# Patient Record
Sex: MALE | Race: ASIAN | NOT HISPANIC OR LATINO | URBAN - METROPOLITAN AREA
[De-identification: names, ages, dates, MRNs, and addresses within clinical notes are randomized per-mention and may not be internally consistent; named-entity substitution may affect disease eponyms.]

---

## 2018-05-26 ENCOUNTER — EMERGENCY (EMERGENCY)
Facility: HOSPITAL | Age: 28
LOS: 1 days | Discharge: HOME | End: 2018-05-26
Attending: EMERGENCY MEDICINE
Payer: COMMERCIAL

## 2018-05-26 VITALS
TEMPERATURE: 98 F | HEART RATE: 77 BPM | RESPIRATION RATE: 15 BRPM | DIASTOLIC BLOOD PRESSURE: 71 MMHG | OXYGEN SATURATION: 99 % | SYSTOLIC BLOOD PRESSURE: 106 MMHG

## 2018-05-26 DIAGNOSIS — R09.89 OTHER SPECIFIED SYMPTOMS AND SIGNS INVOLVING THE CIRCULATORY AND RESPIRATORY SYSTEMS: ICD-10-CM

## 2018-05-26 PROCEDURE — 99284 EMERGENCY DEPT VISIT MOD MDM: CPT

## 2018-05-26 PROCEDURE — 71045 X-RAY EXAM CHEST 1 VIEW: CPT

## 2018-05-26 PROCEDURE — 99283 EMERGENCY DEPT VISIT LOW MDM: CPT

## 2018-05-26 PROCEDURE — 70360 X-RAY EXAM OF NECK: CPT | Mod: 26

## 2018-05-26 PROCEDURE — 71045 X-RAY EXAM CHEST 1 VIEW: CPT | Mod: 26

## 2018-05-26 PROCEDURE — 70360 X-RAY EXAM OF NECK: CPT

## 2018-05-26 RX ORDER — ACETAMINOPHEN 500 MG
975 TABLET ORAL ONCE
Qty: 0 | Refills: 0 | Status: COMPLETED | OUTPATIENT
Start: 2018-05-26 | End: 2018-05-26

## 2018-05-26 RX ADMIN — Medication 975 MILLIGRAM(S): at 23:22

## 2018-05-26 NOTE — CONSULT NOTE ADULT - ASSESSMENT
28 y/o c/o right sided throat pain with FB sensation after eating fish. Laryngoscopy done at bedside, no foreign body seen. No laceration or bleeding. Airway patent.

## 2018-05-26 NOTE — ED PROVIDER NOTE - ATTENDING CONTRIBUTION TO CARE
27 yom no pmhx presents after eating the head of a fish earlier in the day at a restaurant. states felt a fb sensation after swallowing round shaped fish bone on accident - states went to an ENT in [place] and was scoped - as per note sent by ENT no visualized FB, was sent to ER for further eval. pt is able to tolerate secretions. states feels a fb sensation to right side of lower pharynx.     ros as above    Physical Exam:   constitutional - well appearing, awake and alert, oriented x3  head - no external evidence of trauma  cvs - rrr, no murmurs, no peripheral edema  resp - breath sounds clear and equal bilat  gi - abdomen soft and nontender, no rigidity, guarding or rebound, bowel sounds present  msk - moving all extremities spontaneously  neuro - alert and oriented x3, no focal deficits, CNs 2-12 grossly intact  skin- no jaundice, warm and dry  psych - mood and affect wnl, no apparent risk to self or others   ent - no pharyngeal erythema, no visualized fb.    xrays without radioopaque fb - seen again by ent in ED without any fb visualized. more likely small abrasion to posterior pharynx - pt gabbie PO well. additional verbal instructions regarding diagnosis, return precautions and follow up plan given to pt and/or family. NISHANT Prabhakar MD

## 2018-05-26 NOTE — CONSULT NOTE ADULT - PROBLEM SELECTOR RECOMMENDATION 9
Tylenol for pain as needed  If continues to have FB sensation, follow up with Dr Rutherford tomorrow as an outpatient [phone]

## 2018-05-26 NOTE — CONSULT NOTE ADULT - SUBJECTIVE AND OBJECTIVE BOX
CC: foreign body sensation     HPI: 28yo male pt, no PMHx, smoker c/o right sided throat pain with FB sensation after eating fish at lunch time. Pt stated he ate the fish head and the bone (round shaped) was struck in throat. Pt's seen by ENT Dr. ÁLVAREZ in [place] and sent to ED for further evaluation. Pt was able to drink water. Denies CP/SOB/ABD pain. Denies sensory changes or weakness to extremities.    PAST MEDICAL & SURGICAL HISTORY:    Allergies    No Known Allergies    Intolerances      MEDICATIONS  (STANDING):    MEDICATIONS  (PRN):    Social History: Current every day smoker    ROS: ENT, GI, , CV, Pulm, Neuro, Psych, MS, Heme, Endo, Constitutional; all negative except as noted in HPI    Vital Signs Last 24 Hrs  T(C): 36.8 (26 May 2018 20:40), Max: 36.8 (26 May 2018 20:40)  T(F): 98.3 (26 May 2018 20:40), Max: 98.3 (26 May 2018 20:40)  HR: 77 (26 May 2018 20:40) (77 - 77)  BP: 106/71 (26 May 2018 20:40) (106/71 - 106/71)  BP(mean): --  RR: 15 (26 May 2018 20:40) (15 - 15)  SpO2: 99% (26 May 2018 20:40) (99% - 99%)              PHYSICAL EXAM:  Gen: NAD, well-developed  Head: Normocephalic, Atraumatic  Face: no edema/erythema/fluctuance, parotid glands soft without mass  Eyes: PERRL, EOMI, no scleral injection  Nose: Nares bilaterally patent, no discharge  Mouth: Mucosa moist, tongue/uvula midline, oropharynx clear  Neck: Flat, supple, no lymphadenopathy, trachea midline, no masses  Resp: no use of accessory muscles, no stridor  CV: no peripheral edema/cyanosis      Laryngoscopy (scope #4): No bleeding in nasal pharynx or Oral pharynx.  No foreign body or pooling of secretions.  No glottic / supraglottic swelling.  Vocal cords mobile in intact b/l.  Airway patent.

## 2018-05-26 NOTE — ED PROVIDER NOTE - PHYSICAL EXAMINATION
NAD, VSS, Afebrile, No facial or neck swelling, No visualized FB in throat. Normal throat with intact air way. No lymphadenopathy. Neuro- intact.